# Patient Record
Sex: MALE | Race: WHITE | NOT HISPANIC OR LATINO | Employment: PART TIME | URBAN - NONMETROPOLITAN AREA
[De-identification: names, ages, dates, MRNs, and addresses within clinical notes are randomized per-mention and may not be internally consistent; named-entity substitution may affect disease eponyms.]

---

## 2025-07-02 ENCOUNTER — OFFICE VISIT (OUTPATIENT)
Dept: URGENT CARE | Age: 20
End: 2025-07-02
Payer: COMMERCIAL

## 2025-07-02 VITALS
RESPIRATION RATE: 16 BRPM | HEIGHT: 71 IN | SYSTOLIC BLOOD PRESSURE: 118 MMHG | OXYGEN SATURATION: 98 % | WEIGHT: 170 LBS | HEART RATE: 70 BPM | BODY MASS INDEX: 23.8 KG/M2 | TEMPERATURE: 98.5 F | DIASTOLIC BLOOD PRESSURE: 66 MMHG

## 2025-07-02 DIAGNOSIS — A74.9 CHLAMYDIA: ICD-10-CM

## 2025-07-02 DIAGNOSIS — Z20.2 STD EXPOSURE: Primary | ICD-10-CM

## 2025-07-02 LAB
CHLAMYDIA TRACHOMATIS: DETECTED
NEISSERIA GONORRHOEAE: NOT DETECTED

## 2025-07-02 RX ORDER — DOXYCYCLINE 100 MG/1
100 CAPSULE ORAL 2 TIMES DAILY
Qty: 14 CAPSULE | Refills: 0 | Status: SHIPPED | OUTPATIENT
Start: 2025-07-02 | End: 2025-07-09

## 2025-07-02 ASSESSMENT — PATIENT HEALTH QUESTIONNAIRE - PHQ9
2. FEELING DOWN, DEPRESSED OR HOPELESS: NOT AT ALL
1. LITTLE INTEREST OR PLEASURE IN DOING THINGS: NOT AT ALL
SUM OF ALL RESPONSES TO PHQ9 QUESTIONS 1 & 2: 0

## 2025-07-02 NOTE — PROGRESS NOTES
"Subjective   Patient ID: Chris Wilson is a 20 y.o. male. They present today with a chief complaint of Exposure to STD (PT states he was exposed to chlamydia x 2 months ago. PT states no symptoms at this time. ).    History of Present Illness    Exposure to STD      Past Medical History  Allergies as of 07/02/2025    (No Known Allergies)       Prescriptions Prior to Admission[1]     Medical History[2]    Surgical History[3]     reports that he has never smoked. He uses smokeless tobacco. He reports current alcohol use. He reports current drug use. Drug: Marijuana.    Review of Systems  Review of Systems   All other systems reviewed and are negative.                                 Objective    Vitals:    07/02/25 1529   BP: (!) 153/92   Pulse: 70   Resp: 16   Temp: 36.9 °C (98.5 °F)   TempSrc: Oral   SpO2: 98%   Weight: 77.1 kg (170 lb)   Height: 1.803 m (5' 11\")     No LMP for male patient.    Physical Exam  Vitals reviewed.   Constitutional:       Appearance: Normal appearance.   HENT:      Head: Normocephalic and atraumatic.   Cardiovascular:      Rate and Rhythm: Normal rate and regular rhythm.      Pulses: Normal pulses.      Heart sounds: Normal heart sounds.   Pulmonary:      Effort: Pulmonary effort is normal.      Breath sounds: Normal breath sounds.   Abdominal:      General: Abdomen is flat. Bowel sounds are normal.      Palpations: Abdomen is soft.   Musculoskeletal:         General: Normal range of motion.      Cervical back: Normal range of motion.   Skin:     General: Skin is warm.      Capillary Refill: Capillary refill takes less than 2 seconds.   Neurological:      General: No focal deficit present.      Mental Status: He is alert and oriented to person, place, and time.   Psychiatric:         Mood and Affect: Mood normal.         Behavior: Behavior normal.         Procedures    Point of Care Test & Imaging Results from this visit  No results found for this visit on 07/02/25.   Imaging  No " results found.    Cardiology, Vascular, and Other Imaging  No other imaging results found for the past 2 days      Diagnostic study results (if any) were reviewed by BÁRBARA Mcgee.    Assessment/Plan   Allergies, medications, history, and pertinent labs/EKGs/Imaging reviewed by BÁRBARA Mcgee.     Medical Decision Making  20-year-old male presents with possible chlamydia exposure.  Patient reports that he had unprotected intercourse a month and a half ago and his partner told him she had chlamydia.  Patient denies any symptoms.  Denies penile discharge denies dysuria denies abdominal pain fever chills.  On exam patient is nontoxic-appearing vital signs are stable heart rate is regular lungs are clear bilaterally abdomen is soft nontender there is no CVA tenderness flank pain or back pain.  Patient gave a urine specimen and in-house testing of chlamydia and gonorrhea is pending.  Testing is complete and resulted chlamydia is positive gonorrhea is negative.  Patient is to start doxycycline as directed he is to refrain from intercourse until 2 weeks after antibiotics are finished.  He is to tell any partners that he has chlamydia.  He is to go the emergency room with any worsening symptoms.  Patient agrees with plan of care patient left in stable condition.    Orders and Diagnoses  There are no diagnoses linked to this encounter.    Medical Admin Record      Patient disposition: Home    Electronically signed by BÁRBARA Mcgee  3:35 PM           [1] (Not in a hospital admission)  [2] No past medical history on file.  [3] No past surgical history on file.

## 2025-07-02 NOTE — PATIENT INSTRUCTIONS
Start doxycycline as directed refrain from intercourse until 2 weeks after antibiotics are finished.  Follow-up with your primary care doctor and go to the emergency room with any worsening symptoms.  Tell any other partners you have had that you have chlamydia.

## 2025-07-03 ENCOUNTER — TELEPHONE (OUTPATIENT)
Dept: URGENT CARE | Age: 20
End: 2025-07-03